# Patient Record
Sex: FEMALE | Race: WHITE | NOT HISPANIC OR LATINO | Employment: PART TIME | URBAN - METROPOLITAN AREA
[De-identification: names, ages, dates, MRNs, and addresses within clinical notes are randomized per-mention and may not be internally consistent; named-entity substitution may affect disease eponyms.]

---

## 2024-09-16 ENCOUNTER — OFFICE VISIT (OUTPATIENT)
Dept: SLEEP MEDICINE | Age: 69
End: 2024-09-16
Payer: COMMERCIAL

## 2024-09-16 VITALS
BODY MASS INDEX: 35.57 KG/M2 | HEIGHT: 55 IN | OXYGEN SATURATION: 98 % | SYSTOLIC BLOOD PRESSURE: 122 MMHG | WEIGHT: 153.7 LBS | HEART RATE: 74 BPM | DIASTOLIC BLOOD PRESSURE: 68 MMHG | TEMPERATURE: 96.4 F

## 2024-09-16 DIAGNOSIS — G47.19 EXCESSIVE DAYTIME SLEEPINESS: ICD-10-CM

## 2024-09-16 DIAGNOSIS — G47.33 OSA (OBSTRUCTIVE SLEEP APNEA): ICD-10-CM

## 2024-09-16 DIAGNOSIS — R06.83 SNORING: Primary | ICD-10-CM

## 2024-09-16 PROBLEM — I10 ESSENTIAL HYPERTENSION: Status: ACTIVE | Noted: 2024-09-16

## 2024-09-16 PROBLEM — G25.81 RESTLESS LEGS SYNDROME (RLS): Status: ACTIVE | Noted: 2024-09-16

## 2024-09-16 PROCEDURE — 99204 OFFICE O/P NEW MOD 45 MIN: CPT | Performed by: INTERNAL MEDICINE

## 2024-09-16 RX ORDER — CIDER VINEGAR 300 MG
TABLET ORAL DAILY
COMMUNITY

## 2024-09-16 RX ORDER — AMLODIPINE BESYLATE 10 MG/1
1 TABLET ORAL DAILY
COMMUNITY
Start: 2024-06-07

## 2024-09-16 RX ORDER — ASPIRIN 81 MG/1
1 TABLET ORAL DAILY
COMMUNITY

## 2024-09-16 RX ORDER — METOPROLOL TARTRATE 25 MG/1
1 TABLET, FILM COATED ORAL 2 TIMES DAILY
COMMUNITY
Start: 2024-05-23

## 2024-09-16 RX ORDER — PRAMIPEXOLE DIHYDROCHLORIDE 1.5 MG/1
1 TABLET ORAL DAILY
COMMUNITY
Start: 2024-05-03

## 2024-11-18 ENCOUNTER — OFFICE VISIT (OUTPATIENT)
Dept: SLEEP MEDICINE | Age: 69
End: 2024-11-18
Payer: MEDICARE

## 2024-11-18 VITALS
BODY MASS INDEX: 28.71 KG/M2 | DIASTOLIC BLOOD PRESSURE: 60 MMHG | SYSTOLIC BLOOD PRESSURE: 138 MMHG | WEIGHT: 156 LBS | OXYGEN SATURATION: 98 % | HEART RATE: 90 BPM | HEIGHT: 62 IN | TEMPERATURE: 98.5 F

## 2024-11-18 DIAGNOSIS — G47.33 OSA (OBSTRUCTIVE SLEEP APNEA): Primary | ICD-10-CM

## 2024-11-18 PROCEDURE — 1159F MED LIST DOCD IN RCRD: CPT | Performed by: INTERNAL MEDICINE

## 2024-11-18 PROCEDURE — 99214 OFFICE O/P EST MOD 30 MIN: CPT | Performed by: INTERNAL MEDICINE

## 2024-11-18 PROCEDURE — 3078F DIAST BP <80 MM HG: CPT | Performed by: INTERNAL MEDICINE

## 2024-11-18 PROCEDURE — 3075F SYST BP GE 130 - 139MM HG: CPT | Performed by: INTERNAL MEDICINE

## 2024-11-18 PROCEDURE — 1160F RVW MEDS BY RX/DR IN RCRD: CPT | Performed by: INTERNAL MEDICINE

## 2024-11-18 RX ORDER — ROSUVASTATIN CALCIUM 10 MG/1
TABLET, COATED ORAL
COMMUNITY

## 2024-11-18 RX ORDER — MULTIPLE VITAMINS W/ MINERALS TAB 9MG-400MCG
1 TAB ORAL DAILY
COMMUNITY

## 2024-11-18 NOTE — PROGRESS NOTES
Subjective:     Chief Complaint:   Chief Complaint   Patient presents with    Sleep Apnea     Inspire       HPI:    Mckenna Baker is a 69 y.o. female here for follow-up of NUSRAT status post inspire implantation    She has history of obstructive sleep apnea.  She was initially diagnosed with a home sleep apnea test on 1/12/2023 locally.  Her AHI was 28 on the initial testing.  She was prescribed CPAP therapy but had poor tolerance of CPAP.  She could not sleep more than 4 hours with CPAP on at night and this did not help her daytime somnolence.    She therefore looked into inspire therapy.  She had an implantation of her inspire hypoglossal nerve stimulator on 7/30/2024 performed by Dr. Enriquez.    She is here for first visit after activation performed on 9/16/2024.    [place] Scale is: 11/24    Current medications are:   Current Outpatient Medications:     amLODIPine (NORVASC) 10 MG tablet, Take 1 tablet by mouth Daily., Disp: , Rfl:     aspirin 81 MG EC tablet, Take 1 tablet by mouth Daily., Disp: , Rfl:     Calcium 200 MG tablet, Take  by mouth., Disp: , Rfl:     Magnesium Gluconate (MAGNESIUM 27 PO), Take  by mouth., Disp: , Rfl:     metoprolol tartrate (LOPRESSOR) 25 MG tablet, Take 1 tablet by mouth 2 (Two) Times a Day., Disp: , Rfl:     multivitamin with minerals tablet tablet, Take 1 tablet by mouth Daily., Disp: , Rfl:     Omega-3 Fatty Acids (Fish Oil) 1000 MG capsule delayed-release, Daily., Disp: , Rfl:     pramipexole (MIRAPEX) 1.5 MG tablet, Take 1 tablet by mouth Daily., Disp: , Rfl:     rosuvastatin (CRESTOR) 10 MG tablet, TAKE ONE (1) TABLET EVERY DAY BY ORAL ROUTE., Disp: , Rfl: .      The patient's relevant past medical, surgical, family and social history were reviewed and updated in Epic as appropriate.     ROS:    Review of Systems      Objective:    Physical Exam  Vitals reviewed.   Constitutional:       Appearance: She is well-developed.   HENT:      Head: Normocephalic and atraumatic.       "Mouth/Throat:      Mouth: Mucous membranes are moist.      Pharynx: Oropharynx is clear.   Neck:      Thyroid: No thyromegaly.   Pulmonary:      Effort: Pulmonary effort is normal. No respiratory distress.   Skin:     General: Skin is warm and dry.   Neurological:      Mental Status: She is alert and oriented to person, place, and time.   Psychiatric:         Behavior: Behavior normal.         Thought Content: Thought content normal.         DATA:        Assessment:    Problem List Items Addressed This Visit          Pulmonary Problems    NUSRAT (obstructive sleep apnea) - Primary    Overview     Poor CPAP tolerance  Inspire HGNS implantation 7/30/24          69-year-old female with moderate/severe obstructive sleep apnea diagnosed on 1/12/2023 via an HSAT which revealed an AHI of 28.  This was performed locally.  She is from [place].  She was prescribed CPAP therapy initially.  She tolerated CPAP therapy poorly and underwent an inspire hypoglossal nerve stimulator implantation performed by Dr. Enriquez on 7/30/2024    She was activated on 9/16/2024.    She is here for her first follow-up after activation.  She notes significant improvement.  However, she still struggles with usage time as she will sometimes fall asleep on the couch inadvertently and not get it turned on soon enough.    Functional tongue exam acceptable.  Current functional level limits:  0.5 - 1.5 v  Current patient level/amplitude:  1.2 v  Usage time:  40 hrs/week  Sensor waveform was tested and was acceptable    Inspire HGNS device further adjusted with the following settings:    New functional level limits:  1.1 - 2.1 v  Start delay:  30 mins  Pause time:  Increased to 30 mins  Treatment duration:  8 hours    Education:    All questions answered.  Patient instructed to continue to use therapy \"all night, every night\".    Patient will increase stimulation by one functional level (0.1 V) once every 10-14 days.     Plan:     As above.  Discussed " improvement in sleep hygiene to help with usage time.  2-month follow-up and then can consider an in lab titration after the above  She was having difficulty with her dia connecting to her remote and we assisted her with that today.    Discussed in detail with the patient.  She will call prior to her follow up visit for any new problems.    Level of service justified based on 30 minutes spent in patient care on this date of service including, but not limited to: preparing to see the patient, obtaining and/or reviewing history, performing medically appropriate examination, ordering tests/medicine/procedures, independently interpreting results, documenting clinical information in EHR, and counseling/education of patient/family/caregiver.  This is exclusive of time spent on other separate services such as performing procedures or test interpretation, if applicable.  (Level 4 30-39 minutes; Level 5 40-54 minutes)    Signed by  Mina Norton MD

## 2025-01-21 ENCOUNTER — OFFICE VISIT (OUTPATIENT)
Dept: SLEEP MEDICINE | Age: 70
End: 2025-01-21
Payer: MEDICARE

## 2025-01-21 VITALS
DIASTOLIC BLOOD PRESSURE: 60 MMHG | TEMPERATURE: 97.3 F | SYSTOLIC BLOOD PRESSURE: 130 MMHG | HEART RATE: 76 BPM | BODY MASS INDEX: 28.89 KG/M2 | OXYGEN SATURATION: 97 % | WEIGHT: 157 LBS | HEIGHT: 62 IN

## 2025-01-21 DIAGNOSIS — G47.33 OSA (OBSTRUCTIVE SLEEP APNEA): Primary | ICD-10-CM

## 2025-01-21 RX ORDER — GABAPENTIN 300 MG/1
CAPSULE ORAL
COMMUNITY
Start: 2025-01-15

## 2025-01-21 NOTE — PROGRESS NOTES
Subjective:     Chief Complaint:   Chief Complaint   Patient presents with    Follow-up    Sleep Apnea     Inspire       HPI:    Mckenna Baker is a 69 y.o. female here for follow-up of NUSRAT status post inspire implantation    She has history of obstructive sleep apnea.  She was initially diagnosed with a home sleep apnea test on 1/12/2023 locally.  Her AHI was 28 on the initial testing.  She was prescribed CPAP therapy but had poor tolerance of CPAP.  She could not sleep more than 4 hours with CPAP on at night and this did not help her daytime somnolence.    She therefore looked into inspire therapy.  She had an implantation of her inspire hypoglossal nerve stimulator on 7/30/2024 performed by Dr. Enriquez.    She is here for second visit after activation performed on 9/16/2024.    [place] Scale is: 17/24    Current medications are:   Current Outpatient Medications:     amLODIPine (NORVASC) 10 MG tablet, Take 1 tablet by mouth Daily., Disp: , Rfl:     aspirin 81 MG EC tablet, Take 1 tablet by mouth Daily., Disp: , Rfl:     Calcium 200 MG tablet, Take  by mouth., Disp: , Rfl:     gabapentin (NEURONTIN) 300 MG capsule, TAKE (1) CAPSULE THREE TIMES DAILY AS NEEDED FOR PAIN, Disp: , Rfl:     Magnesium Gluconate (MAGNESIUM 27 PO), Take  by mouth., Disp: , Rfl:     metoprolol tartrate (LOPRESSOR) 25 MG tablet, Take 1 tablet by mouth 2 (Two) Times a Day., Disp: , Rfl:     multivitamin with minerals tablet tablet, Take 1 tablet by mouth Daily., Disp: , Rfl:     Omega-3 Fatty Acids (Fish Oil) 1000 MG capsule delayed-release, Daily., Disp: , Rfl:     pramipexole (MIRAPEX) 1.5 MG tablet, Take 1 tablet by mouth Daily., Disp: , Rfl:     rosuvastatin (CRESTOR) 10 MG tablet, TAKE ONE (1) TABLET EVERY DAY BY ORAL ROUTE., Disp: , Rfl: .      The patient's relevant past medical, surgical, family and social history were reviewed and updated in Epic as appropriate.     ROS:    Review of Systems      Objective:    Physical Exam  Vitals  reviewed.   Constitutional:       Appearance: She is well-developed.   HENT:      Head: Normocephalic and atraumatic.      Mouth/Throat:      Mouth: Mucous membranes are moist.      Pharynx: Oropharynx is clear.      Comments: Class IV airway  Neck:      Thyroid: No thyromegaly.   Pulmonary:      Effort: Pulmonary effort is normal. No respiratory distress.   Skin:     General: Skin is warm and dry.   Neurological:      Mental Status: She is alert and oriented to person, place, and time.   Psychiatric:         Behavior: Behavior normal.         Thought Content: Thought content normal.         DATA:        Assessment:    Problem List Items Addressed This Visit          Pulmonary Problems    NUSRAT (obstructive sleep apnea) - Primary    Overview     Poor CPAP tolerance  Inspire HGNS implantation 7/30/24            69-year-old female with moderate/severe obstructive sleep apnea diagnosed on 1/12/2023 via an HSAT which revealed an AHI of 28.  This was performed locally.  She is from [place].  She was prescribed CPAP therapy initially.  She tolerated CPAP therapy poorly and underwent an inspire hypoglossal nerve stimulator implantation performed by Dr. Enriquez on 7/30/2024    She was activated on 9/16/2024.    She is for second follow-up after activation.  She has had a bit of a setback.  She has had some personal issues with her mother as well as with some pain issues that have inhibited her sleep.  She would sometimes fall asleep on the couch and not activate her device.  As result her usage is down and she is falling behind on her voltage escalation and is not tolerating voltages that she previously did tolerate.    Functional tongue exam acceptable.  Current functional level limits: 1.1-2.1 V  Current patient level/amplitude: 1.3 V  Usage time: 21 hrs/week  Sensor waveform was tested and was acceptable    Inspire HGNS device further adjusted with the following settings:    New functional level limits: Decreased to  "0.5-1.5 V and current amplitude decreased to 0.8 V  Start delay: Increased to 60 minutes  Pause time: 30 minutes  Treatment duration:  8 hours    Education:    All questions answered.  Patient instructed to continue to use therapy \"all night, every night\".    Patient will increase stimulation by one functional level (0.1 V) once every 10-14 days.     Plan:     We basically have had to back up somewhat but now she will start using it more consistently and she can escalate the voltages as tolerated every 10-14 days  2-month follow-up  Further adjustments after the above and then timing of a sleep study could be considered depending on her progress    Discussed in detail with the patient.  She will call prior to her follow up visit for any new problems.    Level of service justified based on 30 minutes spent in patient care on this date of service including, but not limited to: preparing to see the patient, obtaining and/or reviewing history, performing medically appropriate examination, ordering tests/medicine/procedures, independently interpreting results, documenting clinical information in EHR, and counseling/education of patient/family/caregiver.  This is exclusive of time spent on other separate services such as performing procedures or test interpretation, if applicable.  (Level 4 30-39 minutes; Level 5 40-54 minutes)    Signed by  Mian Norton MD    "

## 2025-04-02 ENCOUNTER — OFFICE VISIT (OUTPATIENT)
Dept: SLEEP MEDICINE | Age: 70
End: 2025-04-02
Payer: MEDICARE

## 2025-04-02 VITALS
HEIGHT: 62 IN | BODY MASS INDEX: 28.34 KG/M2 | SYSTOLIC BLOOD PRESSURE: 122 MMHG | TEMPERATURE: 98.9 F | HEART RATE: 75 BPM | WEIGHT: 154 LBS | OXYGEN SATURATION: 95 % | DIASTOLIC BLOOD PRESSURE: 62 MMHG

## 2025-04-02 DIAGNOSIS — G25.81 RESTLESS LEGS SYNDROME (RLS): ICD-10-CM

## 2025-04-02 DIAGNOSIS — G47.33 OSA (OBSTRUCTIVE SLEEP APNEA): Primary | ICD-10-CM

## 2025-04-02 NOTE — PROGRESS NOTES
Subjective:     Chief Complaint:   Chief Complaint   Patient presents with    Sleep Apnea     Inspire    Restless Legs Syndrome       HPI:    Mckenna Baker is a 69 y.o. female here for follow-up of NUSRAT status post inspire implantation    She has history of obstructive sleep apnea.  She was initially diagnosed with a home sleep apnea test on 1/12/2023 locally.  Her AHI was 28 on the initial testing.  She was prescribed CPAP therapy but had poor tolerance of CPAP.  She could not sleep more than 4 hours with CPAP on at night and this did not help her daytime somnolence.    She therefore looked into inspire therapy.  She had an implantation of her inspire hypoglossal nerve stimulator on 7/30/2024 performed by Dr. Enriquez.    She is here for her third visit after activation.  Her usage remains relatively low.  Details as below.    [place] Scale is: 9/24    Current medications are:   Current Outpatient Medications:     amLODIPine (NORVASC) 10 MG tablet, Take 1 tablet by mouth Daily., Disp: , Rfl:     aspirin 81 MG EC tablet, Take 1 tablet by mouth Daily., Disp: , Rfl:     Calcium 200 MG tablet, Take  by mouth., Disp: , Rfl:     Magnesium Gluconate (MAGNESIUM 27 PO), Take  by mouth., Disp: , Rfl:     metoprolol tartrate (LOPRESSOR) 25 MG tablet, Take 1 tablet by mouth 2 (Two) Times a Day., Disp: , Rfl:     multivitamin with minerals tablet tablet, Take 1 tablet by mouth Daily., Disp: , Rfl:     Omega-3 Fatty Acids (Fish Oil) 1000 MG capsule delayed-release, Daily., Disp: , Rfl:     pramipexole (MIRAPEX) 1.5 MG tablet, Take 1 tablet by mouth Daily., Disp: , Rfl:     rosuvastatin (CRESTOR) 10 MG tablet, TAKE ONE (1) TABLET EVERY DAY BY ORAL ROUTE., Disp: , Rfl: .      The patient's relevant past medical, surgical, family and social history were reviewed and updated in Epic as appropriate.     ROS:    Review of Systems      Objective:    Physical Exam  Vitals reviewed.   Constitutional:       Appearance: She is well-developed.    HENT:      Head: Normocephalic and atraumatic.      Mouth/Throat:      Mouth: Mucous membranes are moist.      Pharynx: Oropharynx is clear.      Comments: Class IV airway  Neck:      Thyroid: No thyromegaly.   Pulmonary:      Effort: Pulmonary effort is normal. No respiratory distress.   Skin:     General: Skin is warm and dry.   Neurological:      Mental Status: She is alert and oriented to person, place, and time.   Psychiatric:         Behavior: Behavior normal.         Thought Content: Thought content normal.         DATA:        Assessment:    Problem List Items Addressed This Visit          Pulmonary Problems    NUSRAT (obstructive sleep apnea) - Primary    Overview   Poor CPAP tolerance  Inspire HGNS implantation 7/30/24            Other    Restless legs syndrome (RLS)         69-year-old female with moderate/severe obstructive sleep apnea diagnosed on 1/12/2023 via an HSAT which revealed an AHI of 28.  This was performed locally.  She is from [place].  She was prescribed CPAP therapy initially.  She tolerated CPAP therapy poorly and underwent an inspire hypoglossal nerve stimulator implantation performed by Dr. Enriquez on 7/30/2024    She was activated on 9/16/2024.    She has had some issues with compliance and usage time.  In January she related some personal issues with her mother as well as some pain issues.  She will currently often fall asleep on the couch and not activate her device.  She is using it around 20 hours/week.  She is benefiting from therapy nonetheless and she is much less sleepy during the day.    Further details of her visit:    Functional tongue exam acceptable.  Current functional level limits: 0.5-1.5 V  Current patient level/amplitude: 1.1 V  Usage time: 20 hrs/week  Sensor waveform was tested and was acceptable    Inspire HGNS device further adjusted with the following settings:    New functional level limits: No change  Start delay: 60 minutes  Pause time: 30  "minutes  Treatment duration:  8 hours    Education:    All questions answered.  Patient instructed to continue to use therapy \"all night, every night\".    Patient will increase stimulation as tolerated.  We encouraged her to use the device for all of her sleep and if she is able to do that she may develop increased tolerance and strength and inability to increase her stimulation further    Plan:     As above, if she can start using the device for the majority of her sleep time then she can better define if she has met her amplitude tolerance limit and we can do her titration PSG  In the interim 2-month follow-up    Discussed in detail with the patient.  She will call prior to her follow up visit for any new problems.    Level of service justified based on 30 minutes spent in patient care on this date of service including, but not limited to: preparing to see the patient, obtaining and/or reviewing history, performing medically appropriate examination, ordering tests/medicine/procedures, independently interpreting results, documenting clinical information in EHR, and counseling/education of patient/family/caregiver.  This is exclusive of time spent on other separate services such as performing procedures or test interpretation, if applicable.  (Level 4 30-39 minutes; Level 5 40-54 minutes)    Signed by  Mina Norton MD    "

## 2025-06-04 ENCOUNTER — OFFICE VISIT (OUTPATIENT)
Dept: SLEEP MEDICINE | Age: 70
End: 2025-06-04
Payer: MEDICARE

## 2025-06-04 VITALS
WEIGHT: 144.3 LBS | HEIGHT: 62 IN | DIASTOLIC BLOOD PRESSURE: 66 MMHG | TEMPERATURE: 98.5 F | SYSTOLIC BLOOD PRESSURE: 128 MMHG | HEART RATE: 76 BPM | OXYGEN SATURATION: 96 % | BODY MASS INDEX: 26.55 KG/M2

## 2025-06-04 DIAGNOSIS — G47.33 OSA (OBSTRUCTIVE SLEEP APNEA): Primary | ICD-10-CM

## 2025-06-04 PROBLEM — F17.200 SMOKER: Status: ACTIVE | Noted: 2024-07-30

## 2025-06-04 PROBLEM — Z87.410 HISTORY OF CERVICAL DYSPLASIA: Status: ACTIVE | Noted: 2017-05-23

## 2025-06-04 PROBLEM — Z98.890 HISTORY OF REPAIR OF HIP JOINT: Status: ACTIVE | Noted: 2025-06-04

## 2025-06-04 PROBLEM — I34.0 MITRAL VALVE REGURGITATION: Status: ACTIVE | Noted: 2020-10-22

## 2025-06-04 NOTE — PROGRESS NOTES
Subjective:     Chief Complaint:   Chief Complaint   Patient presents with    Sleep Apnea     Inspire       HPI:    Mckenna Baker is a 69 y.o. female here for follow-up of NUSRAT status post inspire implantation    She has history of obstructive sleep apnea.  She was initially diagnosed with a home sleep apnea test on 1/12/2023 locally.  Her AHI was 28 on the initial testing.  She was prescribed CPAP therapy but had poor tolerance of CPAP.  She could not sleep more than 4 hours with CPAP on at night and this did not help her daytime somnolence.    She therefore looked into inspire therapy.  She had an implantation of her inspire hypoglossal nerve stimulator on 7/30/2024 performed by Dr. Enriquez.    She is here for her fourth visit after activation.      [place] Scale is: 9/24    Current medications are:   Current Outpatient Medications:     amLODIPine (NORVASC) 10 MG tablet, Take 1 tablet by mouth Daily., Disp: , Rfl:     aspirin 81 MG EC tablet, Take 1 tablet by mouth Daily., Disp: , Rfl:     Calcium 200 MG tablet, Take  by mouth., Disp: , Rfl:     Magnesium Gluconate (MAGNESIUM 27 PO), Take  by mouth., Disp: , Rfl:     metoprolol tartrate (LOPRESSOR) 25 MG tablet, Take 1 tablet by mouth 2 (Two) Times a Day., Disp: , Rfl:     multivitamin with minerals tablet tablet, Take 1 tablet by mouth Daily., Disp: , Rfl:     Omega-3 Fatty Acids (Fish Oil) 1000 MG capsule delayed-release, Daily., Disp: , Rfl:     pramipexole (MIRAPEX) 1.5 MG tablet, Take 1 tablet by mouth Daily., Disp: , Rfl:     rosuvastatin (CRESTOR) 10 MG tablet, TAKE ONE (1) TABLET EVERY DAY BY ORAL ROUTE., Disp: , Rfl: .      The patient's relevant past medical, surgical, family and social history were reviewed and updated in Epic as appropriate.     ROS:    Review of Systems      Objective:    Physical Exam  Vitals reviewed.   Constitutional:       Appearance: She is well-developed.   HENT:      Head: Normocephalic and atraumatic.      Mouth/Throat:       Mouth: Mucous membranes are moist.      Pharynx: Oropharynx is clear.      Comments: Class IV airway  Neck:      Thyroid: No thyromegaly.   Pulmonary:      Effort: Pulmonary effort is normal. No respiratory distress.   Skin:     General: Skin is warm and dry.   Neurological:      Mental Status: She is alert and oriented to person, place, and time.   Psychiatric:         Behavior: Behavior normal.         Thought Content: Thought content normal.         DATA:        Assessment:    Problem List Items Addressed This Visit          Pulmonary Problems    NUSRAT (obstructive sleep apnea) - Primary    Overview   Poor CPAP tolerance  Inspire HGNS implantation 7/30/24         Relevant Orders    Inspire Titration         69-year-old female with moderate/severe obstructive sleep apnea diagnosed on 1/12/2023 via an HSAT which revealed an AHI of 28.  This was performed locally.  She is from [place].  She was prescribed CPAP therapy initially.  She tolerated CPAP therapy poorly and underwent an inspire hypoglossal nerve stimulator implantation performed by Dr. Enriquez on 7/30/2024    She was activated on 9/16/2024.    Her usage time at the time of her last visit was low.  She was having problems falling asleep without activating the device.    She has done better from a usage standpoint.  She is up from 20 to 31 hours/week.  She says that she clearly feels better when she uses her therapy.  She has been unable to increase the amplitude much further without being uncomfortable.    Further details of her visit:    Functional tongue exam acceptable.  Current functional level limits: 0.5-1.5 V  Current patient level/amplitude: 1.1 V  Usage time: 31 hours/week  Sensor waveform was tested and was acceptable    Inspire HGNS device further adjusted with the following settings:    New functional level limits: No change  Start delay: 60 minutes  Pause time: 30 minutes  Treatment duration:  8 hours    Education:    All questions  "answered.  Patient instructed to continue to use therapy \"all night, every night\".    She is having problems with her phone dia and she has been in touch with technical support on this issue.  This has happened before but they were able to fix it.    Plan:     She can continue to test her upper voltage limit but I will go ahead and schedule the inspire titration in our lab for the summer.  Follow-up thereafter    Discussed in detail with the patient.  She will call prior to her follow up visit for any new problems.    Level of service justified based on 31 minutes spent in patient care on this date of service including, but not limited to: preparing to see the patient, obtaining and/or reviewing history, performing medically appropriate examination, ordering tests/medicine/procedures, independently interpreting results, documenting clinical information in EHR, and counseling/education of patient/family/caregiver.  This is exclusive of time spent on other separate services such as performing procedures or test interpretation, if applicable.  (Level 4 30-39 minutes; Level 5 40-54 minutes)    Signed by  Mina Norton MD    "